# Patient Record
Sex: FEMALE
[De-identification: names, ages, dates, MRNs, and addresses within clinical notes are randomized per-mention and may not be internally consistent; named-entity substitution may affect disease eponyms.]

---

## 2020-07-01 PROBLEM — Z00.00 ENCOUNTER FOR PREVENTIVE HEALTH EXAMINATION: Status: ACTIVE | Noted: 2020-07-01

## 2020-07-02 ENCOUNTER — APPOINTMENT (OUTPATIENT)
Dept: OTOLARYNGOLOGY | Facility: CLINIC | Age: 35
End: 2020-07-02
Payer: COMMERCIAL

## 2020-07-02 DIAGNOSIS — H92.09 OTALGIA, UNSPECIFIED EAR: ICD-10-CM

## 2020-07-02 DIAGNOSIS — Z82.49 FAMILY HISTORY OF ISCHEMIC HEART DISEASE AND OTHER DISEASES OF THE CIRCULATORY SYSTEM: ICD-10-CM

## 2020-07-02 PROCEDURE — 92557 COMPREHENSIVE HEARING TEST: CPT

## 2020-07-02 PROCEDURE — 92567 TYMPANOMETRY: CPT

## 2020-07-02 PROCEDURE — 99203 OFFICE O/P NEW LOW 30 MIN: CPT

## 2020-07-02 RX ORDER — CETIRIZINE HCL 10 MG
TABLET ORAL
Refills: 0 | Status: ACTIVE | COMMUNITY

## 2020-07-02 RX ORDER — NITROFURANTOIN MACROCRYSTAL 100 MG
CAPSULE ORAL
Refills: 0 | Status: ACTIVE | COMMUNITY

## 2020-07-06 NOTE — ASSESSMENT
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 Patient education material was provided. I have asked her to seek a TMJ D. specialist. Audiogram was normal.No further workup or treatment is required at this time.

## 2020-07-06 NOTE — CONSULT LETTER
[Consult Letter:] : I had the pleasure of evaluating your patient, [unfilled]. [Dear  ___] : Dear  [unfilled], [Please see my note below.] : Please see my note below. [Sincerely,] : Sincerely, [FreeTextEntry1] : Enclosed- audiogram. [FreeTextEntry3] : Jian Dallas MD\par [facility]- Co-Director\par [facility]

## 2020-07-06 NOTE — HISTORY OF PRESENT ILLNESS
[de-identified] : Initial visit. She is complaining of pain in both ears worse on the left than the right. She describes the pain is radiating down her neck into her left shoulder.\par She describes a sensation of the area. This is often associated with sore throat. She has had some time of visits with her primary care physician who initially treated her for the possibility of strep throat with an antibiotic.\par Then he treated her with a muscle relaxant which she does not take.\par \par She describes that she carries a lot of her stress incontinence she.\par \par She is also describing intermittent tinnitus.\par \par All the symptoms have developed in January and to some extent exacerbated in the last 3 months.\par She does not report unexplained weight loss, hemoptysis, dysphagia.\par She does report potentially unexplained 10 pound weight loss in the last 4 months.

## 2020-07-17 ENCOUNTER — TRANSCRIPTION ENCOUNTER (OUTPATIENT)
Age: 35
End: 2020-07-17

## 2020-07-17 ENCOUNTER — APPOINTMENT (OUTPATIENT)
Dept: PHYSICAL MEDICINE AND REHAB | Facility: CLINIC | Age: 35
End: 2020-07-17
Payer: COMMERCIAL
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
## 2020-07-17 DIAGNOSIS — G89.29 CERVICALGIA: ICD-10-CM

## 2020-07-17 DIAGNOSIS — S46.912D STRAIN OF UNSPECIFIED MUSCLE, FASCIA AND TENDON AT SHOULDER AND UPPER ARM LEVEL, LEFT ARM, SUBSEQUENT ENCOUNTER: ICD-10-CM

## 2020-07-17 DIAGNOSIS — M79.2 NEURALGIA AND NEURITIS, UNSPECIFIED: ICD-10-CM

## 2020-07-17 DIAGNOSIS — H93.19 TINNITUS, UNSPECIFIED EAR: ICD-10-CM

## 2020-07-17 DIAGNOSIS — M54.2 CERVICALGIA: ICD-10-CM

## 2020-07-17 DIAGNOSIS — M79.9 SOFT TISSUE DISORDER, UNSPECIFIED: ICD-10-CM

## 2020-07-17 DIAGNOSIS — M26.609 UNSPECIFIED TEMPOROMANDIBULAR JOINT DISORDER: ICD-10-CM

## 2020-07-17 PROCEDURE — 99204 OFFICE O/P NEW MOD 45 MIN: CPT | Mod: 95

## 2020-07-17 NOTE — PHYSICAL EXAM
[FreeTextEntry1] : PHYSICAL EXAM : OBJECTIVE \par \par GENERAL : Awake ,alert and oriented to time place and person \par HEAD : normocephalic and atraumatic \par NECK : supple ,no tracheal deviation\par EYES : sclera and conjunctiva normal no redness,intact extraocular movements \par ENT  : ears and nose normal in appearance -hearing adequate \par PULMONARY: effort normal. No respiratory distress. breathing regular. No wheezes \par LYMPH : No swelling in limbs, capillary return within normal range \par CVS : not short of breath, no visible jugular venous distention\par PSYCH : mood and affect normal ,good eye contact ,normal attention \par ABDOMEN : no visible distension , \par NEUROLOGICAL:cranial nerves intact,muscle tone normal,gait and balance safe except where noted below \par SKIN : warm and dry No rash detected over specific body areas examined \par MUSCULOSKELETAL: normal muscle bulk, no focal bony tenderness /posture normal except where specified below\par \par Jaw: + audible clicking while opening jaw\par MSK: ROM of bilateral UEs intact and full, strength appears to be 5/5 throughout\par

## 2020-07-17 NOTE — REVIEW OF SYSTEMS
[Patient Intake Form Reviewed] : Patient intake form was reviewed [Joint Pain] : joint pain [Muscle Pain] : muscle pain [Negative] : Heme/Lymph [Fever] : no fever [Fatigue] : no fatigue [Chills] : no chills [Difficulty Walking] : no difficulty walking [FreeTextEntry4] : +jaw pain, throat pain, neck pain [FreeTextEntry9] : +pain into left shoulder from neck

## 2020-07-17 NOTE — HISTORY OF PRESENT ILLNESS
[FreeTextEntry1] : Pt is a 35 y/o left handed F who is presenting with left jaw, neck, shoulder pain. Pt states that she has had the jaw pain since January, and she notices that she clenches her jaw throughout the day and while asleep. She says the pain is predominantly present at the point where her jaw and left ear come together, and the pain will radiate down her left neck into her left shoulder. She also notices some left throat pain beneath her jaw. She has audible clicking with opening of her mouth. Patient also complains of left hand pain and redness. 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 She notices that her neck becomes more painful while working and straining. She has had a decrease in her activity level, but is just having a bicycle ordered and is trying to increase her activity as tolerated. Pt has been sleeping 7-7.5 hours.

## 2020-07-17 NOTE — REASON FOR VISIT
[Medical Office: ([facility])___] : at the medical office located in  [Home] : at home, [unfilled] , at the time of the visit. [Verbal consent obtained from patient] : the patient, [unfilled] [Initial Evaluation] : an initial evaluation [FreeTextEntry1] : referred by Dr. Dallas

## 2020-07-17 NOTE — ASSESSMENT
[FreeTextEntry1] : Pt is a 35 y/o F presenting with left jaw pain radiating to her left neck and shoulder.\par \par Recommend PT for jaw exercises, neck and shoulder massage and ultrasound, and work station ergonomic optimization. No need for any medications or injections at this time.\par Pt to return to office in 6 weeks for reevaluation of her jaw pain.\par \par The patient was given handouts to read on this condition,helpful hints ,exercises etc \par all questions answered\par

## 2020-08-25 ENCOUNTER — APPOINTMENT (OUTPATIENT)
Dept: OTOLARYNGOLOGY | Facility: CLINIC | Age: 35
End: 2020-08-25
Payer: COMMERCIAL

## 2020-08-25 DIAGNOSIS — R07.0 PAIN IN THROAT: ICD-10-CM

## 2020-08-25 PROCEDURE — 99214 OFFICE O/P EST MOD 30 MIN: CPT | Mod: 95

## 2020-08-25 RX ORDER — OMEPRAZOLE 40 MG/1
40 CAPSULE, DELAYED RELEASE ORAL DAILY
Qty: 30 | Refills: 2 | Status: ACTIVE | COMMUNITY
Start: 2020-08-25 | End: 1900-01-01

## 2020-09-15 NOTE — ASSESSMENT
[FreeTextEntry1] : By history it appears that she may have L. [place]. Patient education materials were female to her.\par I will empirically treated with omeprazole.\par I will see her in followup in 6 weeks.\par Pending her clinical course further workup will be considered.

## 2020-09-15 NOTE — HISTORY OF PRESENT ILLNESS
[de-identified] : Visit was done over the computer using [employer] secondary to Covid.\par Her chief complaint is postnasal drip and throat soreness. This is been ongoing for the last 4-6 weeks. She does not complain of dysphagia, unexplained weight loss or hemoptysis. She does not have a fever.